# Patient Record
Sex: FEMALE | Race: WHITE | NOT HISPANIC OR LATINO | Employment: FULL TIME | URBAN - METROPOLITAN AREA
[De-identification: names, ages, dates, MRNs, and addresses within clinical notes are randomized per-mention and may not be internally consistent; named-entity substitution may affect disease eponyms.]

---

## 2021-09-02 ENCOUNTER — OFFICE VISIT (OUTPATIENT)
Dept: URGENT CARE | Facility: CLINIC | Age: 51
End: 2021-09-02
Payer: COMMERCIAL

## 2021-09-02 VITALS
SYSTOLIC BLOOD PRESSURE: 150 MMHG | HEIGHT: 67 IN | WEIGHT: 196 LBS | TEMPERATURE: 98.3 F | DIASTOLIC BLOOD PRESSURE: 69 MMHG | BODY MASS INDEX: 30.76 KG/M2 | RESPIRATION RATE: 20 BRPM | HEART RATE: 80 BPM | OXYGEN SATURATION: 99 %

## 2021-09-02 DIAGNOSIS — S61.412A LACERATION OF LEFT HAND WITHOUT FOREIGN BODY, INITIAL ENCOUNTER: Primary | ICD-10-CM

## 2021-09-02 PROCEDURE — 12001 RPR S/N/AX/GEN/TRNK 2.5CM/<: CPT | Performed by: PHYSICIAN ASSISTANT

## 2021-09-02 PROCEDURE — 99213 OFFICE O/P EST LOW 20 MIN: CPT | Performed by: PHYSICIAN ASSISTANT

## 2021-09-02 NOTE — PROGRESS NOTES
NAME: Naz Mock is a 46 y o  female  : 1970    MRN: [id number]      Assessment and Plan   Laceration of left hand without foreign body, initial encounter [S61 [id number]]  1  Laceration of left hand without foreign body, initial encounter  Laceration repair        Patient swab wound is small and non gaping -repaired with glue and instructed to watch for signs of infection  Was immediately cleaned out and repaired supple hold off on antibiotics as patient is also otherwise healthy  She acknowledges      Patient Instructions     Patient Instructions   Keep area clean and dry   Antibiotic ointment when glue wears off  Follow up if any signs of infection     Proceed to ER if symptoms worsen  Chief Complaint     Chief Complaint   Patient presents with    Hand Laceration     left hand, injury occured today, pt stabbed hand, cleaned wound out and closed with butterfly bandage  no ice or pain meds  pt is allergic to tetanus, and has not recieved vaccine since 18 (systematic rash)          History of Present Illness    Patient with no reported past medical history presents complaining of laceration to left hand times few hours  Reports she was using a knife which was clean and accidentally punctured the dorsal aspect of her left hand  Reports immediately washed the area out and applied butterfly strips  She states she is allergic to tetanus and is unable to receive the vaccine  She reports the pain is "mildly sore" and states she has some mild tingling to the index finger but otherwise feels fine  Review of Systems   Review of Systems   Constitutional: Negative for chills and fever  Gastrointestinal: Negative for nausea and vomiting  Skin: Positive for wound  Neurological: Negative for dizziness, weakness and light-headedness  Current Medications     No current outpatient medications on file      Current Allergies     Allergies as of 2021 - Reviewed 2021   Allergen Reaction Noted    Other Rash 10/06/2020              History reviewed  No pertinent past medical history  History reviewed  No pertinent surgical history  No family history on file  Medications have been verified  The following portions of the patient's history were reviewed and updated as appropriate: allergies, current medications, past family history, past medical history, past social history, past surgical history and problem list     Objective   /69   Pulse 80   Temp 98 3 °F (36 8 °C) (Tympanic)   Resp 20   Ht 5' 7" (1 702 m)   Wt 88 9 kg (196 lb)   SpO2 99%   BMI 30 70 kg/m²        Laceration repair    Date/Time: 9/2/2021 1:55 PM  Performed by: Arnold Andrade PA-C  Authorized by: Arnold Andrade PA-C   Consent: Verbal consent obtained  Risks and benefits: risks, benefits and alternatives were discussed  Consent given by: patient  Patient understanding: patient states understanding of the procedure being performed  Patient identity confirmed: verbally with patient  Time out: Immediately prior to procedure a "time out" was called to verify the correct patient, procedure, equipment, support staff and site/side marked as required  Body area: upper extremity  Location details: left hand  Laceration length: 0 5 cm  Foreign bodies: no foreign bodies  Tendon involvement: none  Nerve involvement: none      Procedure Details:  Irrigation solution: saline  Irrigation method: jet lavage  Skin closure: glue  Approximation difficulty: simple  Patient tolerance: patient tolerated the procedure well with no immediate complications        Physical Exam     Physical Exam  Vitals and nursing note reviewed  Constitutional:       General: She is not in acute distress  Appearance: Normal appearance  She is not ill-appearing, toxic-appearing or diaphoretic  Cardiovascular:      Rate and Rhythm: Normal rate and regular rhythm     Pulmonary:      Effort: Pulmonary effort is normal  No respiratory distress  Skin:     Comments: 0 5 cm superficial clean laceration to the dorsal aspect of the hand at the area between the 1st and 2nd MCP joints  No active bleeding  No foreign bodies  Not gaping  Minimally tender to palpation over the wound  No tenderness surrounding  Full AROM of fingers and wrist without pain or restriction  Full strength against resistance without pain of the fingers  Full sensation to light touch is intact throughout the hand and fingers  Capillary refill at the distal tips less than 2 seconds   Neurological:      Mental Status: She is alert

## 2021-09-02 NOTE — PATIENT INSTRUCTIONS
Keep area clean and dry   Antibiotic ointment when glue wears off  Follow up if any signs of infection